# Patient Record
Sex: FEMALE | Race: WHITE | Employment: FULL TIME | URBAN - METROPOLITAN AREA
[De-identification: names, ages, dates, MRNs, and addresses within clinical notes are randomized per-mention and may not be internally consistent; named-entity substitution may affect disease eponyms.]

---

## 2017-03-10 ENCOUNTER — HOSPITAL ENCOUNTER (OUTPATIENT)
Dept: ULTRASOUND IMAGING | Facility: HOSPITAL | Age: 52
Discharge: HOME | End: 2017-03-10
Attending: PHYSICIAN ASSISTANT
Payer: COMMERCIAL

## 2017-03-10 DIAGNOSIS — E04.1 THYROID NODULE: ICD-10-CM

## 2017-03-10 PROCEDURE — 88173 CYTOPATH EVAL FNA REPORT: CPT | Performed by: PHYSICIAN ASSISTANT

## 2017-03-10 PROCEDURE — 76942 ECHO GUIDE FOR BIOPSY: CPT

## 2017-03-10 PROCEDURE — 10022 US FNA THYROID SH(CPT=10022/76942): CPT

## 2017-03-10 NOTE — PROCEDURES
[facility]  Procedure Note    [id number] [facility] Patient Status:  Outpatient    1965 MRN [id number]   Location [address] [facility] Attending Krysta Small PA-C   Hosp Day # 0 PCP No primary care provider on file.      Procedure: US guided FN

## 2017-03-17 NOTE — PROGRESS NOTES
Quick Note:    Pt informed of results and recommendations. Pt scheduled follow up appt.  With Dr. Aimee Xavier 4/11/17  ______

## 2017-03-17 NOTE — PROGRESS NOTES
Quick Note:    Please inform FNA to left thyroid nodule appears benign, right is nondiagnostic due to limited cells in sample, recommend she follow up with Dr Flynn Merlin to further discuss these results.   ______

## 2017-04-24 ENCOUNTER — TELEPHONE (OUTPATIENT)
Dept: OBGYN CLINIC | Facility: CLINIC | Age: 52
End: 2017-04-24

## 2017-04-24 DIAGNOSIS — Z12.31 VISIT FOR SCREENING MAMMOGRAM: Primary | ICD-10-CM

## 2017-04-24 NOTE — TELEPHONE ENCOUNTER
VENECIA IS REQUESTING AN ORDER FOR A MAMMOGRAM BE PUT INTO THE [email or web address] SYSTEM.  HER PHONE # [phone]

## 2017-04-24 NOTE — TELEPHONE ENCOUNTER
Message left physical is due. We will send order to [facility] for mammogram. She can call Tuesday to schedule.

## 2017-05-06 ENCOUNTER — HOSPITAL ENCOUNTER (OUTPATIENT)
Dept: MAMMOGRAPHY | Age: 52
Discharge: HOME | End: 2017-05-06
Attending: OBSTETRICS & GYNECOLOGY
Payer: COMMERCIAL

## 2017-05-06 DIAGNOSIS — Z12.31 VISIT FOR SCREENING MAMMOGRAM: ICD-10-CM

## 2017-05-06 PROCEDURE — 77067 SCR MAMMO BI INCL CAD: CPT | Performed by: OBSTETRICS & GYNECOLOGY

## 2017-05-12 ENCOUNTER — OFFICE VISIT (OUTPATIENT)
Dept: OBGYN CLINIC | Facility: CLINIC | Age: 52
End: 2017-05-12

## 2017-05-12 VITALS
TEMPERATURE: 98 F | DIASTOLIC BLOOD PRESSURE: 64 MMHG | RESPIRATION RATE: 16 BRPM | WEIGHT: 132 LBS | HEIGHT: 67 IN | HEART RATE: 60 BPM | BODY MASS INDEX: 20.72 KG/M2 | SYSTOLIC BLOOD PRESSURE: 108 MMHG

## 2017-05-12 DIAGNOSIS — Z11.51 SCREENING FOR HUMAN PAPILLOMAVIRUS: ICD-10-CM

## 2017-05-12 DIAGNOSIS — Z12.39 BREAST CANCER SCREENING: ICD-10-CM

## 2017-05-12 DIAGNOSIS — Z01.419 ENCOUNTER FOR ANNUAL ROUTINE GYNECOLOGICAL EXAMINATION: Primary | ICD-10-CM

## 2017-05-12 PROCEDURE — 88175 CYTOPATH C/V AUTO FLUID REDO: CPT | Performed by: OBSTETRICS & GYNECOLOGY

## 2017-05-12 PROCEDURE — 87624 HPV HI-RISK TYP POOLED RSLT: CPT | Performed by: OBSTETRICS & GYNECOLOGY

## 2017-05-12 PROCEDURE — 99396 PREV VISIT EST AGE 40-64: CPT | Performed by: OBSTETRICS & GYNECOLOGY

## 2017-05-12 RX ORDER — LEVOTHYROXINE SODIUM 0.03 MG/1
TABLET ORAL
Qty: 45 TABLET | Refills: 11 | Status: SHIPPED | OUTPATIENT
Start: 2017-05-12 | End: 2018-05-25

## 2017-05-12 NOTE — PROGRESS NOTES
HPI:   Gregg Sandra is a 45year old  who presents for an annual gynecological exam.   Menses: No LMP recorded (lmp unknown). Patient is not currently having periods (Reason: Menopause). Menopause: postmenopausal    No bleeding.  Seeing Dr Radha Beatty LMP  (LMP Unknown)  Breastfeeding?  No  GENERAL:  Well-appearing, no apparent distress, well nourished, well developed  SKIN: Normal, no rashes, no acne, no hirsutism, no ulcers  HEENT: Atraumatic, normocephalic, throat is clear  NECK: No masses, symmetric menopausal.  · I encouraged pt to have yearly annual examinations with her PCP for management of general medical problems. · - I encouraged smoking cessation, if indicated.     Pt voiced understanding of all instructions; all questions answered; no barrier

## 2017-05-19 ENCOUNTER — TELEPHONE (OUTPATIENT)
Dept: OBGYN CLINIC | Facility: CLINIC | Age: 52
End: 2017-05-19

## 2017-05-19 NOTE — TELEPHONE ENCOUNTER
Patient states this is an FYI for Dr Ayla Louie on Levothyroxine dose. She is on Levothyroxine 37.5mcg per day. The pill is 25 mcg. She takes 1 and 1/2 pills per day for a total of 45 pills per month.

## 2017-12-11 ENCOUNTER — TELEPHONE (OUTPATIENT)
Dept: OBGYN CLINIC | Facility: CLINIC | Age: 52
End: 2017-12-11

## 2017-12-11 NOTE — TELEPHONE ENCOUNTER
Message left per patient request. She needs to have a breast exam for area to be accessed. She has had a mammogram already this year.

## 2017-12-11 NOTE — TELEPHONE ENCOUNTER
Pt has been having some breast pain, no lumps, she would to have a mammogram scheduled.  She did mention she has dense breasts so always needs a more enhanced mammogram.  [id number] [address] to leave a message if she doesn't answer

## 2018-01-10 ENCOUNTER — OFFICE VISIT (OUTPATIENT)
Dept: OBGYN CLINIC | Facility: CLINIC | Age: 53
End: 2018-01-10

## 2018-01-10 VITALS
RESPIRATION RATE: 14 BRPM | DIASTOLIC BLOOD PRESSURE: 70 MMHG | HEIGHT: 65.75 IN | SYSTOLIC BLOOD PRESSURE: 98 MMHG | HEART RATE: 74 BPM | BODY MASS INDEX: 22.77 KG/M2 | TEMPERATURE: 97 F | WEIGHT: 140 LBS

## 2018-01-10 DIAGNOSIS — N64.4 BREAST PAIN: Primary | ICD-10-CM

## 2018-01-10 PROCEDURE — 99213 OFFICE O/P EST LOW 20 MIN: CPT | Performed by: OBSTETRICS & GYNECOLOGY

## 2018-01-10 NOTE — PROGRESS NOTES
CHIEF COMPLAINT:   Patient presents with  Breast pain   HPI:   Margo Urban is a 47year old  No LMP recorded. Patient is not currently having periods (Reason: Menopause). who presents with bilateral breast pain for past 2 mos.  Right more than lef

## 2018-01-23 ENCOUNTER — HOSPITAL ENCOUNTER (OUTPATIENT)
Dept: MAMMOGRAPHY | Facility: HOSPITAL | Age: 53
Discharge: HOME | End: 2018-01-23
Attending: OBSTETRICS & GYNECOLOGY
Payer: COMMERCIAL

## 2018-01-23 DIAGNOSIS — N64.4 BREAST PAIN: ICD-10-CM

## 2018-01-23 PROCEDURE — 76641 ULTRASOUND BREAST COMPLETE: CPT | Performed by: OBSTETRICS & GYNECOLOGY

## 2018-01-23 PROCEDURE — 77062 BREAST TOMOSYNTHESIS BI: CPT | Performed by: OBSTETRICS & GYNECOLOGY

## 2018-01-23 PROCEDURE — 77066 DX MAMMO INCL CAD BI: CPT | Performed by: OBSTETRICS & GYNECOLOGY

## 2018-01-24 DIAGNOSIS — R92.8 ABNORMAL FINDINGS ON DIAGNOSTIC IMAGING OF BREAST: Primary | ICD-10-CM

## 2018-04-30 ENCOUNTER — TELEPHONE (OUTPATIENT)
Dept: OBGYN CLINIC | Facility: CLINIC | Age: 53
End: 2018-04-30

## 2018-04-30 NOTE — TELEPHONE ENCOUNTER
PT Rx expires on 5.11.18 and she needs a refill  Her next appt is 5.16.18  Please call in to [address] ([address] [facility]), [place] [address] [phone], [phone]

## 2018-04-30 NOTE — TELEPHONE ENCOUNTER
PC with patient. She has enough medication to last until her appointment. She just picked up a refill. Told when she comes for appointment she will get an order to check TSH and a new prescription. Understands.

## 2018-05-25 ENCOUNTER — OFFICE VISIT (OUTPATIENT)
Dept: OBGYN CLINIC | Facility: CLINIC | Age: 53
End: 2018-05-25

## 2018-05-25 VITALS
HEART RATE: 60 BPM | RESPIRATION RATE: 12 BRPM | TEMPERATURE: 98 F | HEIGHT: 67 IN | SYSTOLIC BLOOD PRESSURE: 104 MMHG | BODY MASS INDEX: 23.07 KG/M2 | DIASTOLIC BLOOD PRESSURE: 68 MMHG | WEIGHT: 147 LBS

## 2018-05-25 DIAGNOSIS — E03.9 HYPOTHYROIDISM, UNSPECIFIED TYPE: ICD-10-CM

## 2018-05-25 DIAGNOSIS — Z11.51 SCREENING FOR HUMAN PAPILLOMAVIRUS: ICD-10-CM

## 2018-05-25 DIAGNOSIS — Z01.419 ENCOUNTER FOR ANNUAL ROUTINE GYNECOLOGICAL EXAMINATION: Primary | ICD-10-CM

## 2018-05-25 DIAGNOSIS — Z12.39 BREAST CANCER SCREENING: ICD-10-CM

## 2018-05-25 DIAGNOSIS — Z12.4 SCREENING FOR MALIGNANT NEOPLASM OF CERVIX: ICD-10-CM

## 2018-05-25 PROCEDURE — 87624 HPV HI-RISK TYP POOLED RSLT: CPT | Performed by: OBSTETRICS & GYNECOLOGY

## 2018-05-25 PROCEDURE — 88175 CYTOPATH C/V AUTO FLUID REDO: CPT | Performed by: OBSTETRICS & GYNECOLOGY

## 2018-05-25 PROCEDURE — 99396 PREV VISIT EST AGE 40-64: CPT | Performed by: OBSTETRICS & GYNECOLOGY

## 2018-05-25 RX ORDER — LEVOTHYROXINE SODIUM 0.03 MG/1
TABLET ORAL
Qty: 45 TABLET | Refills: 11 | Status: SHIPPED | OUTPATIENT
Start: 2018-05-25 | End: 2019-04-17

## 2018-05-25 NOTE — PROGRESS NOTES
HPI:   Adriana Lemos is a 47year old  who presents for an annual gynecological exam.   Menses: No LMP recorded. Patient is not currently having periods (Reason: Menopause).  Menopause: postmenopausal  Her last menstrual period was more than 2 years 104/68 (BP Location: Right arm, Patient Position: Sitting, Cuff Size: adult)   Pulse 60   Temp 97.6 °F (36.4 °C) (Oral)   Resp 12   Ht 67\"   Wt 147 lb   Breastfeeding?  No   BMI 23.02 kg/m²   GENERAL:  Well-appearing, no apparent distress, well nourished, mammograms to maintain breast health. · I encouraged pt being compliant with screening colonoscopy at the age of 47.   · I encouraged baseline Dexa if menopausal.  · I encouraged pt to have yearly annual examinations with her PCP for management of general

## 2018-07-26 ENCOUNTER — TELEPHONE (OUTPATIENT)
Dept: OBGYN CLINIC | Facility: CLINIC | Age: 53
End: 2018-07-26

## 2018-08-14 ENCOUNTER — HOSPITAL ENCOUNTER (OUTPATIENT)
Dept: MAMMOGRAPHY | Facility: HOSPITAL | Age: 53
Discharge: HOME | End: 2018-08-14
Attending: OBSTETRICS & GYNECOLOGY
Payer: COMMERCIAL

## 2018-08-14 DIAGNOSIS — Z12.39 BREAST CANCER SCREENING: ICD-10-CM

## 2018-08-14 PROCEDURE — 77067 SCR MAMMO BI INCL CAD: CPT | Performed by: OBSTETRICS & GYNECOLOGY

## 2018-08-14 PROCEDURE — 77063 BREAST TOMOSYNTHESIS BI: CPT | Performed by: OBSTETRICS & GYNECOLOGY

## 2019-04-17 RX ORDER — LEVOTHYROXINE SODIUM 0.03 MG/1
TABLET ORAL
Qty: 45 TABLET | Refills: 11 | Status: SHIPPED | OUTPATIENT
Start: 2019-04-17 | End: 2019-08-06

## 2019-05-29 ENCOUNTER — OFFICE VISIT (OUTPATIENT)
Dept: OBGYN CLINIC | Facility: CLINIC | Age: 54
End: 2019-05-29
Payer: COMMERCIAL

## 2019-05-29 ENCOUNTER — TELEPHONE (OUTPATIENT)
Dept: OBGYN CLINIC | Facility: CLINIC | Age: 54
End: 2019-05-29

## 2019-05-29 VITALS
TEMPERATURE: 99 F | WEIGHT: 140 LBS | HEIGHT: 67 IN | RESPIRATION RATE: 12 BRPM | HEART RATE: 72 BPM | DIASTOLIC BLOOD PRESSURE: 70 MMHG | SYSTOLIC BLOOD PRESSURE: 102 MMHG | BODY MASS INDEX: 21.97 KG/M2

## 2019-05-29 DIAGNOSIS — Z01.419 ENCOUNTER FOR ANNUAL ROUTINE GYNECOLOGICAL EXAMINATION: Primary | ICD-10-CM

## 2019-05-29 DIAGNOSIS — Z78.0 ASYMPTOMATIC POSTMENOPAUSAL STATUS (AGE-RELATED) (NATURAL): Primary | ICD-10-CM

## 2019-05-29 DIAGNOSIS — Z11.51 SCREENING FOR HUMAN PAPILLOMAVIRUS: ICD-10-CM

## 2019-05-29 DIAGNOSIS — Z12.4 SCREENING FOR MALIGNANT NEOPLASM OF THE CERVIX: ICD-10-CM

## 2019-05-29 DIAGNOSIS — Z12.39 BREAST CANCER SCREENING: ICD-10-CM

## 2019-05-29 PROCEDURE — 88175 CYTOPATH C/V AUTO FLUID REDO: CPT | Performed by: OBSTETRICS & GYNECOLOGY

## 2019-05-29 PROCEDURE — 87624 HPV HI-RISK TYP POOLED RSLT: CPT | Performed by: OBSTETRICS & GYNECOLOGY

## 2019-05-29 PROCEDURE — 99396 PREV VISIT EST AGE 40-64: CPT | Performed by: OBSTETRICS & GYNECOLOGY

## 2019-05-29 RX ORDER — LEVOTHYROXINE SODIUM 0.03 MG/1
25 TABLET ORAL
Qty: 90 TABLET | Refills: 0 | Status: SHIPPED | OUTPATIENT
Start: 2019-05-29 | End: 2019-08-06

## 2019-05-29 NOTE — TELEPHONE ENCOUNTER
Patient was seen today, was told there would be an order for a dexa scan. No order was placed. Wanted to double check if order is needed.

## 2019-05-29 NOTE — PROGRESS NOTES
HPI:   Ranger Niece is a 46year old  who presents for an annual gynecological exam.   Menses: No LMP recorded (lmp unknown). (Menstrual status: Menopause). Menopause: postmenopausal  Patient had a colonoscopy done 2 years ago.   Her paternal grand itching, periods regular   MUSCULOSKELETAL: denies back pain, muscle or joint aches  NEUROLOGIC: denies headaches  PSYCHIATRIC: denies depression or anxiety, mood is good  HEMATOLOGIC: denies history of anemia  ENDOCRINE: denies thyroid history, cold/heat both calcium and vitamin D.  ·  I encouraged monthly self breast exams; pt was told to perform these in the shower; she was instructed to perform these 7-10 days after her menses.   · I highly encouraged pt to be compliant with her yearly screening mammogra

## 2019-05-29 NOTE — PROGRESS NOTES
Pt here for pe/pap. LMP:over 2 years ago  Last pap: 5/25/18 negative  Last mammogram:8/14/18 negative  Birth control:n/a    Pt is hypoactive thyroid. She has supplied a list of bloodwork that she would like ordered.

## 2019-06-14 ENCOUNTER — HOSPITAL ENCOUNTER (OUTPATIENT)
Dept: BONE DENSITY | Age: 54
Discharge: HOME | End: 2019-06-14
Attending: OBSTETRICS & GYNECOLOGY
Payer: COMMERCIAL

## 2019-06-14 DIAGNOSIS — Z78.0 ASYMPTOMATIC POSTMENOPAUSAL STATUS (AGE-RELATED) (NATURAL): ICD-10-CM

## 2019-06-14 PROCEDURE — 77080 DXA BONE DENSITY AXIAL: CPT | Performed by: OBSTETRICS & GYNECOLOGY

## 2019-06-19 ENCOUNTER — OFFICE VISIT (OUTPATIENT)
Dept: OBGYN CLINIC | Facility: CLINIC | Age: 54
End: 2019-06-19
Payer: COMMERCIAL

## 2019-06-19 VITALS
RESPIRATION RATE: 12 BRPM | DIASTOLIC BLOOD PRESSURE: 80 MMHG | WEIGHT: 150 LBS | SYSTOLIC BLOOD PRESSURE: 114 MMHG | TEMPERATURE: 98 F | HEIGHT: 67 IN | HEART RATE: 64 BPM | BODY MASS INDEX: 23.54 KG/M2

## 2019-06-19 DIAGNOSIS — M81.0 AGE-RELATED OSTEOPOROSIS WITHOUT CURRENT PATHOLOGICAL FRACTURE: Primary | ICD-10-CM

## 2019-06-19 PROCEDURE — 99213 OFFICE O/P EST LOW 20 MIN: CPT | Performed by: OBSTETRICS & GYNECOLOGY

## 2019-06-19 RX ORDER — ALENDRONATE SODIUM 70 MG/1
70 TABLET ORAL WEEKLY
Qty: 12 TABLET | Refills: 1 | Status: SHIPPED | OUTPATIENT
Start: 2019-06-19 | End: 2019-08-06

## 2019-06-19 NOTE — PROGRESS NOTES
HPI:   Eve Daley is a 46year old female presents for consultation regarding a recent DEXA scan showing osteoporosis. Her lumbar spine showed a T score of -2.9.   Hip analysis with T score -1.4 and femoral neck with T score of -2.0 she is on thyroid before breakfast. Disp: 90 tablet Rfl: 0   Levothyroxine Sodium 25 MCG Oral Tab Take 1.5 tablets daily Disp: 45 tablet Rfl: 11         Allergies:  No Known Allergies    Social History:    Denies tobacco or alcohol    Family History:  Family History   Probl

## 2019-06-19 NOTE — PROGRESS NOTES
Pt here for follow up on dexa scan . She states she will need refill on levothyroxine.   She sees endocrinologist in August.

## 2019-07-15 ENCOUNTER — TELEPHONE (OUTPATIENT)
Dept: OBGYN CLINIC | Facility: CLINIC | Age: 54
End: 2019-07-15

## 2019-07-15 NOTE — TELEPHONE ENCOUNTER
Patient was given a prescription for calcium supplement, but want to know if there is something over the counter she can take instead

## 2019-07-17 NOTE — TELEPHONE ENCOUNTER
PC with patient. She states she prefers not to take the Fosamax. She does not like to take medications. Asking if there is an otc option? She is taking Calcium 756 mg- BID and Calcium 1000 mg daily. She has also increased her dairy.

## 2019-07-18 NOTE — TELEPHONE ENCOUNTER
PC with patient. Per Dr Mily Jones. She should not take more than 1200 mg of calcium per day and to get weight bearing exercises in regularly. Understands.

## 2019-07-25 RX ORDER — LEVOTHYROXINE SODIUM 0.03 MG/1
TABLET ORAL
Qty: 45 TABLET | Refills: 0 | Status: SHIPPED | OUTPATIENT
Start: 2019-07-25 | End: 2019-08-15

## 2019-07-25 NOTE — TELEPHONE ENCOUNTER
PC with patient. Received refill request for Levothyroxine. She states she has an appointment with a doctor in August but needs 1 month refill. She states she had discussed it with the doctor at her last appointment.

## 2019-08-15 ENCOUNTER — HOSPITAL ENCOUNTER (OUTPATIENT)
Dept: MAMMOGRAPHY | Age: 54
Discharge: HOME | End: 2019-08-15
Attending: OBSTETRICS & GYNECOLOGY
Payer: COMMERCIAL

## 2019-08-15 DIAGNOSIS — Z12.39 BREAST CANCER SCREENING: ICD-10-CM

## 2019-08-15 PROCEDURE — 77067 SCR MAMMO BI INCL CAD: CPT | Performed by: OBSTETRICS & GYNECOLOGY

## 2019-08-15 PROCEDURE — 77063 BREAST TOMOSYNTHESIS BI: CPT | Performed by: OBSTETRICS & GYNECOLOGY

## 2019-08-29 PROCEDURE — 84482 T3 REVERSE: CPT | Performed by: INTERNAL MEDICINE

## 2019-08-29 PROCEDURE — 86800 THYROGLOBULIN ANTIBODY: CPT | Performed by: INTERNAL MEDICINE

## 2019-08-29 PROCEDURE — 84630 ASSAY OF ZINC: CPT | Performed by: INTERNAL MEDICINE

## 2019-08-29 PROCEDURE — 86376 MICROSOMAL ANTIBODY EACH: CPT | Performed by: INTERNAL MEDICINE

## 2019-08-29 PROCEDURE — 84590 ASSAY OF VITAMIN A: CPT | Performed by: INTERNAL MEDICINE

## 2019-08-29 PROCEDURE — 83520 IMMUNOASSAY QUANT NOS NONAB: CPT | Performed by: INTERNAL MEDICINE

## 2019-08-29 PROCEDURE — 84445 ASSAY OF TSI GLOBULIN: CPT | Performed by: INTERNAL MEDICINE

## 2019-08-29 PROCEDURE — 84255 ASSAY OF SELENIUM: CPT | Performed by: INTERNAL MEDICINE

## 2020-07-29 ENCOUNTER — OFFICE VISIT (OUTPATIENT)
Dept: OBGYN CLINIC | Facility: CLINIC | Age: 55
End: 2020-07-29
Payer: COMMERCIAL

## 2020-07-29 VITALS
TEMPERATURE: 98 F | HEIGHT: 67 IN | WEIGHT: 128 LBS | BODY MASS INDEX: 20.09 KG/M2 | DIASTOLIC BLOOD PRESSURE: 76 MMHG | RESPIRATION RATE: 12 BRPM | SYSTOLIC BLOOD PRESSURE: 122 MMHG | HEART RATE: 70 BPM

## 2020-07-29 DIAGNOSIS — Z11.51 SCREENING FOR HUMAN PAPILLOMAVIRUS: ICD-10-CM

## 2020-07-29 DIAGNOSIS — Z01.419 ENCOUNTER FOR ANNUAL ROUTINE GYNECOLOGICAL EXAMINATION: Primary | ICD-10-CM

## 2020-07-29 DIAGNOSIS — Z12.4 SCREENING FOR MALIGNANT NEOPLASM OF THE CERVIX: ICD-10-CM

## 2020-07-29 DIAGNOSIS — Z12.31 BREAST CANCER SCREENING BY MAMMOGRAM: ICD-10-CM

## 2020-07-29 PROCEDURE — 88175 CYTOPATH C/V AUTO FLUID REDO: CPT | Performed by: OBSTETRICS & GYNECOLOGY

## 2020-07-29 PROCEDURE — 3074F SYST BP LT 130 MM HG: CPT | Performed by: OBSTETRICS & GYNECOLOGY

## 2020-07-29 PROCEDURE — 99396 PREV VISIT EST AGE 40-64: CPT | Performed by: OBSTETRICS & GYNECOLOGY

## 2020-07-29 PROCEDURE — 3078F DIAST BP <80 MM HG: CPT | Performed by: OBSTETRICS & GYNECOLOGY

## 2020-07-29 PROCEDURE — 3008F BODY MASS INDEX DOCD: CPT | Performed by: OBSTETRICS & GYNECOLOGY

## 2020-07-29 PROCEDURE — 87624 HPV HI-RISK TYP POOLED RSLT: CPT | Performed by: OBSTETRICS & GYNECOLOGY

## 2020-07-29 NOTE — PROGRESS NOTES
Pt here for pe/pap.   LMP:Menopause  Last pap:5/29/19 negative   Last mammogram;5/29/19 negative   Birth control:Menopause

## 2020-07-29 NOTE — PROGRESS NOTES
HPI:   Alfredo Camacho is a 53year old  who presents for an annual gynecological exam.   Menses: No LMP recorded. (Menstrual status: Menopause).  Menopause: postmenopausal    Decided not to take fosomax for osteoporosis  Current Outpatient Medications intolerance  ALLERGIC: denies allergy symptoms    EXAM:     VITALS: /76 (BP Location: Right arm, Patient Position: Sitting, Cuff Size: adult)   Pulse 70   Temp 98.2 °F (36.8 °C) (Oral)   Resp 12   Ht 67\"   Wt 128 lb (58.1 kg)   Breastfeeding No   BM menses. · I highly encouraged pt to be compliant with her yearly screening mammograms to maintain breast health. · I encouraged pt being compliant with screening colonoscopy at the age of 47.   · I encouraged baseline Dexa if menopausal.  · I encouraged p

## 2020-07-31 LAB — HPV I/H RISK 1 DNA SPEC QL NAA+PROBE: NEGATIVE

## 2020-08-18 ENCOUNTER — TELEPHONE (OUTPATIENT)
Dept: OBGYN CLINIC | Facility: CLINIC | Age: 55
End: 2020-08-18

## 2020-08-18 NOTE — TELEPHONE ENCOUNTER
Reviewed pap and HPV result with patient. Explained ASC-US to patient. Patient recenly statred taking progesterone 25mg daily and estrodial vaginal cream daily just before the pap was preformed.  She is wondering if this could have anything to do with the A

## 2021-04-20 ENCOUNTER — IMMUNIZATION (OUTPATIENT)
Dept: LAB | Age: 56
End: 2021-04-20
Attending: HOSPITALIST
Payer: COMMERCIAL

## 2021-04-20 DIAGNOSIS — Z23 NEED FOR VACCINATION: Primary | ICD-10-CM

## 2021-04-20 PROCEDURE — 0001A SARSCOV2 VAC 30MCG/0.3ML IM: CPT

## 2021-05-11 ENCOUNTER — IMMUNIZATION (OUTPATIENT)
Dept: LAB | Age: 56
End: 2021-05-11
Attending: HOSPITALIST
Payer: COMMERCIAL

## 2021-05-11 DIAGNOSIS — Z23 NEED FOR VACCINATION: Primary | ICD-10-CM

## 2021-05-11 PROCEDURE — 0002A SARSCOV2 VAC 30MCG/0.3ML IM: CPT

## 2021-08-09 ENCOUNTER — OFFICE VISIT (OUTPATIENT)
Dept: OBGYN CLINIC | Facility: CLINIC | Age: 56
End: 2021-08-09
Payer: COMMERCIAL

## 2021-08-09 VITALS
HEIGHT: 67 IN | TEMPERATURE: 99 F | BODY MASS INDEX: 21.66 KG/M2 | DIASTOLIC BLOOD PRESSURE: 72 MMHG | SYSTOLIC BLOOD PRESSURE: 110 MMHG | RESPIRATION RATE: 14 BRPM | HEART RATE: 68 BPM | WEIGHT: 138 LBS

## 2021-08-09 DIAGNOSIS — Z12.31 BREAST CANCER SCREENING BY MAMMOGRAM: ICD-10-CM

## 2021-08-09 DIAGNOSIS — Z01.419 ENCOUNTER FOR ANNUAL ROUTINE GYNECOLOGICAL EXAMINATION: Primary | ICD-10-CM

## 2021-08-09 DIAGNOSIS — Z12.4 SCREENING FOR MALIGNANT NEOPLASM OF CERVIX: ICD-10-CM

## 2021-08-09 DIAGNOSIS — Z11.51 SCREENING FOR HUMAN PAPILLOMAVIRUS: ICD-10-CM

## 2021-08-09 PROCEDURE — 88175 CYTOPATH C/V AUTO FLUID REDO: CPT | Performed by: OBSTETRICS & GYNECOLOGY

## 2021-08-09 PROCEDURE — 99396 PREV VISIT EST AGE 40-64: CPT | Performed by: OBSTETRICS & GYNECOLOGY

## 2021-08-09 PROCEDURE — 3008F BODY MASS INDEX DOCD: CPT | Performed by: OBSTETRICS & GYNECOLOGY

## 2021-08-09 PROCEDURE — 3078F DIAST BP <80 MM HG: CPT | Performed by: OBSTETRICS & GYNECOLOGY

## 2021-08-09 PROCEDURE — 3074F SYST BP LT 130 MM HG: CPT | Performed by: OBSTETRICS & GYNECOLOGY

## 2021-08-09 PROCEDURE — 87624 HPV HI-RISK TYP POOLED RSLT: CPT | Performed by: OBSTETRICS & GYNECOLOGY

## 2021-08-09 NOTE — PROGRESS NOTES
HPI:   Ramakrishna Herr is a 61year old  who presents for an annual gynecological exam.   Menses: No LMP recorded (lmp unknown). Patient is postmenopausal. Menopause: postmenopausal     No current outpatient medications on file.       Past Medical Histo 67\"   Wt 138 lb (62.6 kg)   LMP  (LMP Unknown)   Breastfeeding No   BMI 21.61 kg/m²   GENERAL:  Well-appearing, no apparent distress, well nourished, well developed  SKIN: Normal, no rashes, no acne, no hirsutism, no ulcers  HEENT: Atraumatic, normocephal 50.  · I encouraged baseline Dexa if menopausal.  · I encouraged pt to have yearly annual examinations with her PCP for management of general medical problems. · - I encouraged smoking cessation, if indicated.     Pt voiced understanding of all instruction

## 2021-08-09 NOTE — PROGRESS NOTES
Pt here for pe/pap. LMP:Post menopausal   Last pap:7/29/20 negative   Last mammogram;8/19/20 negative ordered by PCP.   Birth control:N/A

## 2021-08-10 LAB — HPV I/H RISK 1 DNA SPEC QL NAA+PROBE: NEGATIVE

## 2021-08-25 ENCOUNTER — HOSPITAL ENCOUNTER (OUTPATIENT)
Dept: MAMMOGRAPHY | Age: 56
Discharge: HOME | End: 2021-08-25
Attending: OBSTETRICS & GYNECOLOGY
Payer: COMMERCIAL

## 2021-08-25 DIAGNOSIS — Z12.31 BREAST CANCER SCREENING BY MAMMOGRAM: ICD-10-CM

## 2021-08-25 PROCEDURE — 77067 SCR MAMMO BI INCL CAD: CPT | Performed by: OBSTETRICS & GYNECOLOGY

## 2021-08-25 PROCEDURE — 77063 BREAST TOMOSYNTHESIS BI: CPT | Performed by: OBSTETRICS & GYNECOLOGY

## 2021-08-31 ENCOUNTER — HOSPITAL ENCOUNTER (OUTPATIENT)
Dept: MAMMOGRAPHY | Facility: HOSPITAL | Age: 56
Discharge: HOME | End: 2021-08-31
Attending: OBSTETRICS & GYNECOLOGY
Payer: COMMERCIAL

## 2021-08-31 DIAGNOSIS — R92.2 INCONCLUSIVE MAMMOGRAM: ICD-10-CM

## 2021-08-31 PROCEDURE — 76642 ULTRASOUND BREAST LIMITED: CPT | Performed by: OBSTETRICS & GYNECOLOGY

## 2021-08-31 PROCEDURE — 77065 DX MAMMO INCL CAD UNI: CPT | Performed by: OBSTETRICS & GYNECOLOGY

## 2021-08-31 PROCEDURE — 77061 BREAST TOMOSYNTHESIS UNI: CPT | Performed by: OBSTETRICS & GYNECOLOGY

## 2022-05-25 ENCOUNTER — TELEPHONE (OUTPATIENT)
Dept: OBGYN CLINIC | Facility: CLINIC | Age: 57
End: 2022-05-25

## 2022-08-24 ENCOUNTER — OFFICE VISIT (OUTPATIENT)
Dept: OBGYN CLINIC | Facility: CLINIC | Age: 57
End: 2022-08-24
Payer: COMMERCIAL

## 2022-08-24 VITALS
DIASTOLIC BLOOD PRESSURE: 84 MMHG | SYSTOLIC BLOOD PRESSURE: 134 MMHG | HEART RATE: 67 BPM | HEIGHT: 67 IN | BODY MASS INDEX: 21.66 KG/M2 | WEIGHT: 138 LBS

## 2022-08-24 DIAGNOSIS — Z12.31 ENCOUNTER FOR SCREENING MAMMOGRAM FOR MALIGNANT NEOPLASM OF BREAST: ICD-10-CM

## 2022-08-24 DIAGNOSIS — Z01.419 ENCOUNTER FOR ANNUAL ROUTINE GYNECOLOGICAL EXAMINATION: Primary | ICD-10-CM

## 2022-08-30 ENCOUNTER — HOSPITAL ENCOUNTER (OUTPATIENT)
Dept: MAMMOGRAPHY | Age: 57
Discharge: HOME | End: 2022-08-30
Attending: OBSTETRICS & GYNECOLOGY
Payer: COMMERCIAL

## 2022-08-30 DIAGNOSIS — Z12.31 ENCOUNTER FOR SCREENING MAMMOGRAM FOR MALIGNANT NEOPLASM OF BREAST: ICD-10-CM

## 2022-08-30 PROCEDURE — 77063 BREAST TOMOSYNTHESIS BI: CPT | Performed by: OBSTETRICS & GYNECOLOGY

## 2022-08-30 PROCEDURE — 77067 SCR MAMMO BI INCL CAD: CPT | Performed by: OBSTETRICS & GYNECOLOGY

## 2023-07-31 ENCOUNTER — TELEPHONE (OUTPATIENT)
Facility: CLINIC | Age: 58
End: 2023-07-31

## 2023-07-31 DIAGNOSIS — Z12.31 ENCOUNTER FOR SCREENING MAMMOGRAM FOR MALIGNANT NEOPLASM OF BREAST: Primary | ICD-10-CM

## 2023-07-31 NOTE — TELEPHONE ENCOUNTER
Pt called requesting a mammogram order.  Pt is previous [facility] pt, scheduled [facility] In October with AM. Please advise and call when able

## 2023-07-31 NOTE — TELEPHONE ENCOUNTER
Pt was last seen 8/24/22 for her annual exam. She has an upcoming appointment scheduled 10/5/23. She is requesting a screening mammogram order. Order pended. Will call pt once order has been placed.

## 2023-07-31 NOTE — TELEPHONE ENCOUNTER
Pt aware mammogram order has been placed. Central scheduling number given. Verbalized understanding.

## 2023-09-01 ENCOUNTER — HOSPITAL ENCOUNTER (OUTPATIENT)
Dept: MAMMOGRAPHY | Age: 58
Discharge: HOME | End: 2023-09-01
Attending: STUDENT IN AN ORGANIZED HEALTH CARE EDUCATION/TRAINING PROGRAM
Payer: COMMERCIAL

## 2023-09-01 DIAGNOSIS — Z12.31 ENCOUNTER FOR SCREENING MAMMOGRAM FOR MALIGNANT NEOPLASM OF BREAST: ICD-10-CM

## 2023-09-01 PROCEDURE — 77067 SCR MAMMO BI INCL CAD: CPT | Performed by: STUDENT IN AN ORGANIZED HEALTH CARE EDUCATION/TRAINING PROGRAM

## 2023-09-01 PROCEDURE — 77063 BREAST TOMOSYNTHESIS BI: CPT | Performed by: STUDENT IN AN ORGANIZED HEALTH CARE EDUCATION/TRAINING PROGRAM

## 2023-10-06 ENCOUNTER — OFFICE VISIT (OUTPATIENT)
Facility: CLINIC | Age: 58
End: 2023-10-06
Payer: COMMERCIAL

## 2023-10-06 VITALS
WEIGHT: 139 LBS | HEIGHT: 67 IN | RESPIRATION RATE: 18 BRPM | DIASTOLIC BLOOD PRESSURE: 60 MMHG | SYSTOLIC BLOOD PRESSURE: 116 MMHG | BODY MASS INDEX: 21.82 KG/M2 | HEART RATE: 77 BPM

## 2023-10-06 DIAGNOSIS — Z01.419 ENCOUNTER FOR ANNUAL ROUTINE GYNECOLOGICAL EXAMINATION: Primary | ICD-10-CM

## 2023-10-06 PROCEDURE — 99396 PREV VISIT EST AGE 40-64: CPT | Performed by: STUDENT IN AN ORGANIZED HEALTH CARE EDUCATION/TRAINING PROGRAM

## 2023-10-06 PROCEDURE — 3074F SYST BP LT 130 MM HG: CPT | Performed by: STUDENT IN AN ORGANIZED HEALTH CARE EDUCATION/TRAINING PROGRAM

## 2023-10-06 PROCEDURE — 3008F BODY MASS INDEX DOCD: CPT | Performed by: STUDENT IN AN ORGANIZED HEALTH CARE EDUCATION/TRAINING PROGRAM

## 2023-10-06 PROCEDURE — 3078F DIAST BP <80 MM HG: CPT | Performed by: STUDENT IN AN ORGANIZED HEALTH CARE EDUCATION/TRAINING PROGRAM

## 2024-09-03 ENCOUNTER — HOSPITAL ENCOUNTER (OUTPATIENT)
Dept: MAMMOGRAPHY | Age: 59
Discharge: HOME | End: 2024-09-03
Attending: STUDENT IN AN ORGANIZED HEALTH CARE EDUCATION/TRAINING PROGRAM
Payer: COMMERCIAL

## 2024-09-03 DIAGNOSIS — Z12.31 ENCOUNTER FOR SCREENING MAMMOGRAM FOR MALIGNANT NEOPLASM OF BREAST: ICD-10-CM

## 2024-09-03 PROCEDURE — 77067 SCR MAMMO BI INCL CAD: CPT | Performed by: STUDENT IN AN ORGANIZED HEALTH CARE EDUCATION/TRAINING PROGRAM

## 2024-09-03 PROCEDURE — 77063 BREAST TOMOSYNTHESIS BI: CPT | Performed by: STUDENT IN AN ORGANIZED HEALTH CARE EDUCATION/TRAINING PROGRAM

## 2024-10-16 ENCOUNTER — PATIENT MESSAGE (OUTPATIENT)
Dept: OBGYN CLINIC | Facility: CLINIC | Age: 59
End: 2024-10-16

## 2024-10-16 ENCOUNTER — OFFICE VISIT (OUTPATIENT)
Dept: OBGYN CLINIC | Facility: CLINIC | Age: 59
End: 2024-10-16
Payer: COMMERCIAL

## 2024-10-16 VITALS
BODY MASS INDEX: 21.66 KG/M2 | HEART RATE: 55 BPM | DIASTOLIC BLOOD PRESSURE: 76 MMHG | WEIGHT: 138 LBS | SYSTOLIC BLOOD PRESSURE: 124 MMHG | HEIGHT: 67 IN

## 2024-10-16 DIAGNOSIS — Z01.419 ENCOUNTER FOR ANNUAL ROUTINE GYNECOLOGICAL EXAMINATION: Primary | ICD-10-CM

## 2024-10-16 DIAGNOSIS — Z12.4 ENCOUNTER FOR PAPANICOLAOU SMEAR FOR CERVICAL CANCER SCREENING: ICD-10-CM

## 2024-10-16 DIAGNOSIS — M80.00XA OSTEOPOROSIS WITH CURRENT PATHOLOGICAL FRACTURE, UNSPECIFIED OSTEOPOROSIS TYPE, INITIAL ENCOUNTER: Primary | ICD-10-CM

## 2024-10-16 DIAGNOSIS — Z91.89 AT RISK FOR OSTEOPOROSIS: ICD-10-CM

## 2024-10-16 PROCEDURE — 99396 PREV VISIT EST AGE 40-64: CPT | Performed by: STUDENT IN AN ORGANIZED HEALTH CARE EDUCATION/TRAINING PROGRAM

## 2024-10-16 PROCEDURE — 99459 PELVIC EXAMINATION: CPT | Performed by: STUDENT IN AN ORGANIZED HEALTH CARE EDUCATION/TRAINING PROGRAM

## 2024-10-16 PROCEDURE — 3008F BODY MASS INDEX DOCD: CPT | Performed by: STUDENT IN AN ORGANIZED HEALTH CARE EDUCATION/TRAINING PROGRAM

## 2024-10-16 PROCEDURE — 87624 HPV HI-RISK TYP POOLED RSLT: CPT | Performed by: STUDENT IN AN ORGANIZED HEALTH CARE EDUCATION/TRAINING PROGRAM

## 2024-10-16 PROCEDURE — 3074F SYST BP LT 130 MM HG: CPT | Performed by: STUDENT IN AN ORGANIZED HEALTH CARE EDUCATION/TRAINING PROGRAM

## 2024-10-16 PROCEDURE — 3078F DIAST BP <80 MM HG: CPT | Performed by: STUDENT IN AN ORGANIZED HEALTH CARE EDUCATION/TRAINING PROGRAM

## 2024-10-16 NOTE — PROGRESS NOTES
[facility]  Obstetrics and Gynecology   History & Physical    Chief complaint:   Chief Complaint   Patient presents with    Wellness Visit     Annual Exam         HPI: Jenny Nelson is a 59 year old  with No LMP recorded (lmp unknown). Patient is postmenopausal.      Here for annual GYN exam, no specific concerns  No PMB. No hot flashes. +vaginal dryness but is not bothersome to her  She had a full body MRI done at a private company and was told she had a \"small fracture in her hip\", pt had been essentially asymptomatic, had some pain she thought was musculoskeletal but then it resolved, did not have any specific treatment for this, did not see ortho      Pap Date: 21  Pap Result Notes: Negative    PMHx/Surghx reviewed, below. Of note: uncomplicated, pt reports no medical issues, not on any medications  Never had abnormal pap  Famhx: grandmother had breast cancer, grandfather colon cancer. No ovarian or uteirne cncers    PCP: none     Review of Systems:  Constitutional:  Denies fatigue, night sweats, hot flashes  Eyes:  denies blurred or double vision  Cardiovascular:  denies chest pain or palpitations  Respiratory:  denies shortness of breath  Gastrointestinal:  denies heartburn, abdominal pain, diarrhea or constipation  Genitourinary:  denies dysuria, incontinence, abnormal vaginal discharge, vaginal itching  Musculoskeletal:  denies back pain.  Skin/Breast:  Denies any breast pain, lumps, or discharge.   Neurological:  denies headaches, extremity weakness or numbness.  Psychiatric: denies depression or anxiety.  Endocrine:   denies excessive thirst or urination.  Heme/Lymph:  denies history of anemia, easy bruising or bleeding.    OB History:  OB History    Para Term  AB Living   0 0 0 0 0 0   SAB IAB Ectopic Multiple Live Births   0 0 0 0         Meds:  No current outpatient medications on file prior to visit.     No current facility-administered medications on file prior to  visit.       All:  No Known Allergies    PMH:  Past Medical History:    Amenorrhea    menopause    History of mammogram    Benign    Pap smear for cervical cancer screening    Negative HPV Negative    Thyroid disease       PSH:  History reviewed. No pertinent surgical history.    Social History:  Social History     Socioeconomic History    Marital status: Single     Spouse name: Not on file    Number of children: Not on file    Years of education: Not on file    Highest education level: Not on file   Occupational History    Not on file   Tobacco Use    Smoking status: Never    Smokeless tobacco: Never   Vaping Use    Vaping status: Never Used   Substance and Sexual Activity    Alcohol use: No    Drug use: No    Sexual activity: Not Currently   Other Topics Concern    Caffeine Concern No    Exercise Yes    Seat Belt Yes    Special Diet Yes     Comment: vegan    Stress Concern No    Weight Concern No     Service Not Asked    Blood Transfusions Not Asked    Occupational Exposure Not Asked    Hobby Hazards Not Asked    Sleep Concern Not Asked    Back Care Not Asked    Bike Helmet Not Asked    Self-Exams Not Asked   Social History Narrative    Not on file     Social Drivers of Health     Financial Resource Strain: Not on file   Food Insecurity: Not on file   Transportation Needs: Not on file   Physical Activity: Not on file   Stress: Not on file   Social Connections: Not on file   Housing Stability: Not on file        Family History:  Family History   Problem Relation Age of Onset    Heart Disorder Mother         valve replacement    Cancer Father     Other (Other) Father         neuromuscular disease    Breast Cancer Paternal Grandmother 55        55    Cancer Paternal Grandmother     Cancer Maternal Grandfather     Heart Disorder Maternal Grandfather         quadruple bypass    Ear Problems Neg     Allergies Neg     Bleeding Disorders Neg     Clotting Disorder Neg     Thyroid disease Neg     Hypertension Neg      Asthma Neg     Arthritis Neg     Diabetes Neg     Anesthesia Problems  Neg        PHYSICAL EXAM:     Vitals:    10/16/24 0937   BP: 124/76   Pulse: 55   Weight: 138 lb (62.6 kg)   Height: 67\"       Body mass index is 21.61 kg/m².      Constitutional: well developed, well nourished  Head/Face: normocephalic  Neck/Thyroid: thyroid symmetric, no thyromegaly, no nodules, no adenopathy  Lymphatic: no abnormal supraclavicular or axillary adenopathy is noted  Breast: normal without palpable masses, tenderness, asymmetry, nipple discharge, nipple retraction or skin changes  Abdomen:  soft, nontender, nondistended, no masses  Skin/Hair: no unusual rashes or bruises  Extremities: no edema, no cyanosis  Psychiatric:  Oriented to time, place, person and situation. Appropriate mood and affect    Pelvic Exam:  External Genitalia: normal appearance, hair distribution, and no lesions  Urethral Meatus:  normal in size, location, without lesions and prolapse  Bladder:  No fullness, masses or tenderness  Vagina:  Normal appearance without lesions, no abnormal discharge. + atrophy  Cervix:  Normal without tenderness on motion  Uterus: normal in size, contour, position, mobility, without tenderness  Adnexa: normal without masses or tenderness  Perineum: normal  Anus: no hemorrhoids     Assessment & Plan:     Jenny Nelson is a 59 year old      Diagnoses and all orders for this visit:    Encounter for annual routine gynecological examination  -     ThinPrep PAP Smear; Future  -     Hpv Dna  High Risk , Thin Prep Collect; Future  -     CBC With Differential With Platelet; Future  -     Comp Metabolic Panel (14); Future  -     Lipid Panel; Future  -     Assay, Thyroid Stim Hormone; Future  -     Hemoglobin A1C; Future  -     Vitamin D, 25-Hydroxy; Future    Encounter for Papanicolaou smear for cervical cancer screening  -     ThinPrep PAP Smear; Future  -     Hpv Dna  High Risk , Thin Prep Collect; Future    At risk for  osteoporosis  -     XR DEXA BONE DENSITOMETRY (CPT=77080); Future         Normal breast and pelvic exam  Routine labs ordered as pt does not have current PCP    Healthcare maintenance:  Pap: done  Mammogram 9/2024, negative  Colonoscopy done age 53-54, per pt normal, was told to repeat 10y  DEXA - ordered. D/w pt that if shows osteoporosis would recommend treatment regardless, but that if findings in normal-osteopenic range may recommend seeing a PCP or endocrinologist about this \"small hip fracture\" she had noted on the private full body MRI, and whether that alone would indicate medical management of osteoporosis          Yvette Guan MD  [facility] - OBGYN

## 2024-10-17 ENCOUNTER — HOSPITAL ENCOUNTER (OUTPATIENT)
Dept: BONE DENSITY | Age: 59
Discharge: HOME | End: 2024-10-17
Attending: STUDENT IN AN ORGANIZED HEALTH CARE EDUCATION/TRAINING PROGRAM
Payer: COMMERCIAL

## 2024-10-17 ENCOUNTER — LAB ENCOUNTER (OUTPATIENT)
Dept: LAB | Age: 59
End: 2024-10-17
Attending: STUDENT IN AN ORGANIZED HEALTH CARE EDUCATION/TRAINING PROGRAM
Payer: COMMERCIAL

## 2024-10-17 DIAGNOSIS — R79.89 ELEVATED TSH: ICD-10-CM

## 2024-10-17 DIAGNOSIS — Z91.89 AT RISK FOR OSTEOPOROSIS: ICD-10-CM

## 2024-10-17 DIAGNOSIS — Z01.419 ENCOUNTER FOR ANNUAL ROUTINE GYNECOLOGICAL EXAMINATION: ICD-10-CM

## 2024-10-17 LAB
ALBUMIN SERPL-MCNC: 4.6 G/DL (ref 3.2–4.8)
ALBUMIN/GLOB SERPL: 1.6 {RATIO} (ref 1–2)
ALP LIVER SERPL-CCNC: 85 U/L
ALT SERPL-CCNC: 21 U/L
ANION GAP SERPL CALC-SCNC: 7 MMOL/L (ref 0–18)
AST SERPL-CCNC: 31 U/L (ref ?–34)
BASOPHILS # BLD AUTO: 0.06 X10(3) UL (ref 0–0.2)
BASOPHILS NFR BLD AUTO: 0.9 %
BILIRUB SERPL-MCNC: 0.7 MG/DL (ref 0.2–1.1)
BUN BLD-MCNC: 9 MG/DL (ref 9–23)
CALCIUM BLD-MCNC: 9.8 MG/DL (ref 8.7–10.4)
CHLORIDE SERPL-SCNC: 104 MMOL/L (ref 98–112)
CHOLEST SERPL-MCNC: 217 MG/DL (ref ?–200)
CO2 SERPL-SCNC: 28 MMOL/L (ref 21–32)
CREAT BLD-MCNC: 0.79 MG/DL
EGFRCR SERPLBLD CKD-EPI 2021: 86 ML/MIN/1.73M2 (ref 60–?)
EOSINOPHIL # BLD AUTO: 0.06 X10(3) UL (ref 0–0.7)
EOSINOPHIL NFR BLD AUTO: 0.9 %
ERYTHROCYTE [DISTWIDTH] IN BLOOD BY AUTOMATED COUNT: 12.9 %
EST. AVERAGE GLUCOSE BLD GHB EST-MCNC: 120 MG/DL (ref 68–126)
FASTING PATIENT LIPID ANSWER: YES
FASTING STATUS PATIENT QL REPORTED: YES
GLOBULIN PLAS-MCNC: 2.9 G/DL (ref 2–3.5)
GLUCOSE BLD-MCNC: 92 MG/DL (ref 70–99)
HBA1C MFR BLD: 5.8 % (ref ?–5.7)
HCT VFR BLD AUTO: 41.5 %
HDLC SERPL-MCNC: 64 MG/DL (ref 40–59)
HGB BLD-MCNC: 13.7 G/DL
HPV E6+E7 MRNA CVX QL NAA+PROBE: NEGATIVE
IMM GRANULOCYTES # BLD AUTO: 0.06 X10(3) UL (ref 0–1)
IMM GRANULOCYTES NFR BLD: 0.9 %
LDLC SERPL CALC-MCNC: 135 MG/DL (ref ?–100)
LYMPHOCYTES # BLD AUTO: 1.95 X10(3) UL (ref 1–4)
LYMPHOCYTES NFR BLD AUTO: 29.7 %
MCH RBC QN AUTO: 30.4 PG (ref 26–34)
MCHC RBC AUTO-ENTMCNC: 33 G/DL (ref 31–37)
MCV RBC AUTO: 92.2 FL
MONOCYTES # BLD AUTO: 0.75 X10(3) UL (ref 0.1–1)
MONOCYTES NFR BLD AUTO: 11.4 %
NEUTROPHILS # BLD AUTO: 3.69 X10 (3) UL (ref 1.5–7.7)
NEUTROPHILS # BLD AUTO: 3.69 X10(3) UL (ref 1.5–7.7)
NEUTROPHILS NFR BLD AUTO: 56.2 %
NONHDLC SERPL-MCNC: 153 MG/DL (ref ?–130)
OSMOLALITY SERPL CALC.SUM OF ELEC: 286 MOSM/KG (ref 275–295)
PLATELET # BLD AUTO: 258 10(3)UL (ref 150–450)
POTASSIUM SERPL-SCNC: 4.5 MMOL/L (ref 3.5–5.1)
PROT SERPL-MCNC: 7.5 G/DL (ref 5.7–8.2)
RBC # BLD AUTO: 4.5 X10(6)UL
SODIUM SERPL-SCNC: 139 MMOL/L (ref 136–145)
T4 FREE SERPL-MCNC: 1 NG/DL (ref 0.8–1.7)
TRIGL SERPL-MCNC: 102 MG/DL (ref 30–149)
TSI SER-ACNC: 10.45 MIU/ML (ref 0.55–4.78)
VIT D+METAB SERPL-MCNC: 72.3 NG/ML (ref 30–100)
VLDLC SERPL CALC-MCNC: 19 MG/DL (ref 0–30)
WBC # BLD AUTO: 6.6 X10(3) UL (ref 4–11)

## 2024-10-17 PROCEDURE — 80061 LIPID PANEL: CPT | Performed by: STUDENT IN AN ORGANIZED HEALTH CARE EDUCATION/TRAINING PROGRAM

## 2024-10-17 PROCEDURE — 82306 VITAMIN D 25 HYDROXY: CPT | Performed by: STUDENT IN AN ORGANIZED HEALTH CARE EDUCATION/TRAINING PROGRAM

## 2024-10-17 PROCEDURE — 84439 ASSAY OF FREE THYROXINE: CPT | Performed by: STUDENT IN AN ORGANIZED HEALTH CARE EDUCATION/TRAINING PROGRAM

## 2024-10-17 PROCEDURE — 77080 DXA BONE DENSITY AXIAL: CPT | Performed by: STUDENT IN AN ORGANIZED HEALTH CARE EDUCATION/TRAINING PROGRAM

## 2024-10-17 PROCEDURE — 83036 HEMOGLOBIN GLYCOSYLATED A1C: CPT | Performed by: STUDENT IN AN ORGANIZED HEALTH CARE EDUCATION/TRAINING PROGRAM

## 2024-10-17 PROCEDURE — 80050 GENERAL HEALTH PANEL: CPT | Performed by: STUDENT IN AN ORGANIZED HEALTH CARE EDUCATION/TRAINING PROGRAM

## 2024-10-22 LAB
.: NORMAL
.: NORMAL

## 2024-10-22 NOTE — PROGRESS NOTES
Patient aware of Dr. Guan's recommendation:   \"Pt's thyroid level is elevated - I see she has an appointment with endocrinology, that is perfect, they will be able to advise her on next steps   Cholesterol is slightly high but not too bad, PCP should be able to provide more recommendations (doubt she would need any medication for it, just diet/exercise)\"    Patient verbalized understanding, agreed to and intend to comply with plan of care.

## 2024-11-01 ENCOUNTER — OFFICE VISIT (OUTPATIENT)
Facility: CLINIC | Age: 59
End: 2024-11-01
Payer: COMMERCIAL

## 2024-11-01 VITALS
DIASTOLIC BLOOD PRESSURE: 68 MMHG | BODY MASS INDEX: 20.56 KG/M2 | HEIGHT: 67 IN | WEIGHT: 131 LBS | OXYGEN SATURATION: 97 % | RESPIRATION RATE: 18 BRPM | HEART RATE: 78 BPM | SYSTOLIC BLOOD PRESSURE: 116 MMHG

## 2024-11-01 DIAGNOSIS — M81.0 OSTEOPOROSIS OF MULTIPLE SITES: Primary | ICD-10-CM

## 2024-11-01 DIAGNOSIS — E03.9 HYPOTHYROIDISM, UNSPECIFIED TYPE: ICD-10-CM

## 2024-11-01 PROCEDURE — 3074F SYST BP LT 130 MM HG: CPT | Performed by: STUDENT IN AN ORGANIZED HEALTH CARE EDUCATION/TRAINING PROGRAM

## 2024-11-01 PROCEDURE — 3008F BODY MASS INDEX DOCD: CPT | Performed by: STUDENT IN AN ORGANIZED HEALTH CARE EDUCATION/TRAINING PROGRAM

## 2024-11-01 PROCEDURE — 99214 OFFICE O/P EST MOD 30 MIN: CPT | Performed by: STUDENT IN AN ORGANIZED HEALTH CARE EDUCATION/TRAINING PROGRAM

## 2024-11-01 PROCEDURE — 3078F DIAST BP <80 MM HG: CPT | Performed by: STUDENT IN AN ORGANIZED HEALTH CARE EDUCATION/TRAINING PROGRAM

## 2024-11-01 RX ORDER — LEVOTHYROXINE SODIUM 50 UG/1
50 TABLET ORAL
Qty: 90 TABLET | Refills: 0 | Status: SHIPPED | OUTPATIENT
Start: 2024-11-01 | End: 2025-01-30

## 2024-11-01 NOTE — PATIENT INSTRUCTIONS
You do have elevated TSH which is suggestive of hypothyroidism, and I suggest we start back on a low dose of levothyroxine 50mcg. We'll plan to repeat your thyroid labs in a month to check how the dose is doing.  For your osteoporosis, we'll get some fasting labs (which you can do together with the thyroid labs in a month) to check if there's anything \"else\" contributing to bone loss. Assuming everything is as expected, we'll go ahead and plan to start on Prolia with the idea of using it for 2-3 years and then taking a break.     General follow up information:  Please let us know if you require any refills at least 1 week prior to your medication running out. If you do run out of medication, please call our office ASAP to request refills (do not wait until your follow up).  Please call us if you experience any problems with insurance coverage of medication, lab work, or imaging.   The on-call pager is for urgent matters only. If you are a type 1 diabetic and run out of insulin after business hours 8AM-4PM, you may call the on-call pager for a refill to a 24 hour pharmacy. If you have adrenal insufficiency and run out of steroids, you may call the on-call pager for a refill to a 24 hour pharmacy. All other refill requests should be requested during business hours.    Return Visit   [] Dr. Cherri BROWN pending Prolia start  [] Fasting/8AM labs  [] Dental clearance form

## 2024-11-01 NOTE — H&P
ENDOCRINOLOGY, DIABETES & METABOLISM CONSULT NOTE   Date: 11/1/24  Name: Jenny Nelson  Referring Physician: No ref. provider found    Subjective:    HISTORY OF PRESENT ILLNESS:   Jenny Nelson is a 59 year old female seen in consultation for her bone health evaluation.    OSTEOPOROSIS RISK FACTORS ASSESSMENT  Postmenopausal Yes, did not take HRT   Maternal hx of osteoporosis or hx of parental hip fx:  No   Recent Fracture: Yes, L SI joint fracture in 2024 (apparently atraumatic)   Previous fracture after the age of 50:  Yes, as above   Hx of kidney stones: No   Frequent falls: No   Poor vision: No   Decrease in height: Yes, 0.75\"   New or Worsening Back Pain: No   History of Vit D insufficiency:   Current Vitamin D supplementation: No   Poor intake of calcium:  Daily calcium intake: No   Caffeine intake: No   Smoking: No   Alcohol intake >3/d:  No   Hx of thyroid disease: Yes, hypothyroidism   Hx of calcium problems or hyperparathyroidism: No   Previous treatment for osteoporosis: No   Malabsorption, chronic diarrhea, celiac sprue: Pt is vegan   History of RA: No   History of disordered eating: No     Intake of medications that cause bone loss:  No    Treatment Contraindications:  Dysphagia: No  Pain on swallowing: No  Plans for dental procedures: No, using Invisalign so following frequently with dentist  History of radiation: No  History of bone tumors/bone cancer: No  History of Paget disease of the bone: No  Personal history of malignancy: No  Kidney function: GFR 86 in Oct 2024, CrCl 70.817  Alk phos: Normal  Atrial fibrillation: None  Hyperparathyroidism: Unknown     Allergies, PMH, SocHx and FHx reviewed and updated as appropriate in Epic on    levothyroxine 50 MCG Oral Tab Take 1 tablet (50 mcg total) by mouth before breakfast. 90 tablet 0     Allergies[1]  Current Outpatient Medications   Medication Sig Dispense Refill    levothyroxine 50 MCG Oral Tab Take 1 tablet (50 mcg total) by mouth before breakfast. 90  tablet 0     Past Medical History:    Amenorrhea    menopause    History of mammogram    Benign    Pap smear for cervical cancer screening    Negative HPV Negative    Thyroid disease     History reviewed. No pertinent surgical history.  Social History     Socioeconomic History    Marital status: Single   Tobacco Use    Smoking status: Never    Smokeless tobacco: Never   Vaping Use    Vaping status: Never Used   Substance and Sexual Activity    Alcohol use: No    Drug use: No    Sexual activity: Not Currently   Other Topics Concern    Caffeine Concern No    Exercise Yes    Seat Belt Yes    Special Diet Yes     Comment: vegan    Stress Concern No    Weight Concern No     Family History   Problem Relation Age of Onset    Heart Disorder Mother         valve replacement    Cancer Father     Other (Other) Father         neuromuscular disease    Breast Cancer Paternal Grandmother 55        55    Cancer Paternal Grandmother     Cancer Maternal Grandfather     Heart Disorder Maternal Grandfather         quadruple bypass    Ear Problems Neg     Allergies Neg     Bleeding Disorders Neg     Clotting Disorder Neg     Thyroid disease Neg     Hypertension Neg     Asthma Neg     Arthritis Neg     Diabetes Neg     Anesthesia Problems  Neg        REVIEW OF SYSTEMS: 10 point ROS completed, refer to HPI for pertinent positives    Objective:   PHYSICAL EXAMINATION:    Vitals:    11/01/24 1034   BP: 116/68   Pulse: 78   Resp: 18   SpO2: 97%   Weight: 131 lb (59.4 kg)   Height: 5' 7\" (1.702 m)     BMI: Body mass index is 20.52 kg/m².     General Appearance:  Alert, in no acute distress, thin habitus  Eyes:  normal conjunctivae, sclera  Ears/Nose/Mouth/Throat/Neck:  normal hearing, normal speech and no palpable thyroid nodules  Respiratory:  breathing comfortably on room air, clear to auscultation bilaterally  Cardiovascular:  regular rate and rhythm, no murmurs, no peripheral edema  Psychiatric:  Oriented to person, place and time,  appropriate mood & affect  Skin: Normal moisture and skin texture  Neuro: sensory grossly intact, motor grossly intact. normal gait.    Lab Review     Pertinent labs reviewed      Radiology Review     DEXA Year Location Therapy Lumbar BMD Lumbar T-Score Total Hip BMD Total Hip T-Score Femoral Neck BMD Femoral Neck T-Score Forearm BMD Forearm T-score   2019 [place] None 0.731 -2.9 0.773 -1.4 0.626 -2.0     2024 [place] None 0.682 -3.3 0.711 -1.9 0.575 -2.5                                                                               ASSESSMENT/PLAN:  Jenny Nelson is a 59 year old female seen in consultation for:    #Osteoporosis of multiple sites  #L SI joint fracture    Discussed pathophysiology of bone loss and clinical significance of DEXA scans with the patient.    The patient has confirmed osteoporosis with low T-scores in the lumbar spine and total hip/femoral neck.     Explained the patient's risk factors for osteoporosis, particularly .    The patient is at high risk for future osteoporotic fractures if her osteoporosis remains untreated.    Recommended workup for secondary causes of osteoporosis, including SPEP, PTH, celiac screen, Alk Phos levels, bone turnover markers, and vitamin D levels.    Discussed the importance of adopting lifestyle measures, such as adequate calcium and vitamin D intake, exercise, smoking cessation, counseling on fall prevention, and avoidance of heavy alcohol use, to reduce bone loss.    Suggested 1200 mg of elemental calcium daily (total diet plus supplement) and 1000 IU of vitamin D daily as general recommendations.    Recommended pharmacologic therapy for postmenopausal women with established osteoporosis or fragility fracture.    Discussed available treatment options for osteoporosis, including bisphosphonates (oral vs. IV), anabolics, Evenity, and Prolia injections, as well as their indications, risks, and benefits, including black box warnings.  Discussed concerns about an  increased risk of vertebral fracture after discontinuation of denosumab, the need for indefinite administration of denosumab   Given young age and significantly low T-scores at the level of the spine - if secondary workup unrevealing of any alternate causes of bone loss, recommend initiation of therapy with Prolia for 2-5 years followed by bisphosphonate bridging to drug holidays as appropriate  Reviewed risks/benefits/adverse effects of denosumab - patient agreeable to this plan    Dental clearance form provided    #Hypothyroidism  #Thyroid nodules  -Recent TSH >10, FT4 1.0, was on LT4 in the past and discontinued earlier this year  -Thyroid antibodies negative several years ago  -Etiology of hypothyroidism unclear, pt denies history of neck surgery or radiation, not on any medications that can induce hypothyroidism  -Most recent thyroid US 2018: homogenous echogenicity, R nodule of 2.2cm, L nodule 1.25cm   -Nodules were biopsied in 2017 and were benign  -Start LT4 50mcg daily, repeat TFTs and autoantibodies in 4-6 weeks    The above plan was discussed in detail with the patient who verbalized understanding and agreement.      Angelia Harley DO  [facility]  11/1/2024     Note to patient: The 21 Century Cures Act makes medical notes like these available to patients in the interest of transparency. However, be advised this is a medical document. It is intended as peer to peer communication. It is written in medical language and may contain abbreviations or verbiage that are unfamiliar. It may appear blunt or direct. Medical documents are intended to carry relevant information, facts as evident, and the clinical opinion of the practitioner.         [1] No Known Allergies

## 2024-11-18 ENCOUNTER — TELEPHONE (OUTPATIENT)
Facility: CLINIC | Age: 59
End: 2024-11-18

## 2024-11-18 NOTE — TELEPHONE ENCOUNTER
Last office note: For your osteoporosis, we'll get some fasting labs (which you can do together with the thyroid labs in a month) to check if there's anything \"else\" contributing to bone loss. Assuming everything is as expected, we'll go ahead and plan to start on Prolia with the idea of using it for 2-3 years and then taking a break.     Reviewed Dental clearance- patient cleared.     Clearance form placed in RN brown folder to await next steps.

## 2024-11-18 NOTE — TELEPHONE ENCOUNTER
Fax: Dental Clearance Letter    Dental Exam: 10/14/2024    Patient is cleared to receive reclast or prolia     Place in [address]

## 2024-12-14 ENCOUNTER — LABORATORY ENCOUNTER (OUTPATIENT)
Dept: LAB | Age: 59
End: 2024-12-14
Attending: STUDENT IN AN ORGANIZED HEALTH CARE EDUCATION/TRAINING PROGRAM
Payer: COMMERCIAL

## 2024-12-14 DIAGNOSIS — E03.9 HYPOTHYROIDISM, UNSPECIFIED TYPE: ICD-10-CM

## 2024-12-14 DIAGNOSIS — M81.0 OSTEOPOROSIS OF MULTIPLE SITES: ICD-10-CM

## 2024-12-14 LAB
IGA SERPL-MCNC: 253.4 MG/DL (ref 70–312)
MAGNESIUM SERPL-MCNC: 2 MG/DL (ref 1.6–2.6)
PHOSPHATE SERPL-MCNC: 3.7 MG/DL (ref 2.4–5.1)
PTH-INTACT SERPL-MCNC: 34.9 PG/ML (ref 18.5–88)
T4 FREE SERPL-MCNC: 1.3 NG/DL (ref 0.8–1.7)
THYROGLOB SERPL-MCNC: <15 U/ML (ref ?–60)
THYROPEROXIDASE AB SERPL-ACNC: 28 U/ML (ref ?–60)
TSI SER-ACNC: 4.81 UIU/ML (ref 0.55–4.78)

## 2024-12-14 PROCEDURE — 82784 ASSAY IGA/IGD/IGG/IGM EACH: CPT | Performed by: STUDENT IN AN ORGANIZED HEALTH CARE EDUCATION/TRAINING PROGRAM

## 2024-12-14 PROCEDURE — 86376 MICROSOMAL ANTIBODY EACH: CPT | Performed by: STUDENT IN AN ORGANIZED HEALTH CARE EDUCATION/TRAINING PROGRAM

## 2024-12-14 PROCEDURE — 84165 PROTEIN E-PHORESIS SERUM: CPT | Performed by: STUDENT IN AN ORGANIZED HEALTH CARE EDUCATION/TRAINING PROGRAM

## 2024-12-14 PROCEDURE — 84443 ASSAY THYROID STIM HORMONE: CPT | Performed by: STUDENT IN AN ORGANIZED HEALTH CARE EDUCATION/TRAINING PROGRAM

## 2024-12-14 PROCEDURE — 84100 ASSAY OF PHOSPHORUS: CPT | Performed by: STUDENT IN AN ORGANIZED HEALTH CARE EDUCATION/TRAINING PROGRAM

## 2024-12-14 PROCEDURE — 86334 IMMUNOFIX E-PHORESIS SERUM: CPT | Performed by: STUDENT IN AN ORGANIZED HEALTH CARE EDUCATION/TRAINING PROGRAM

## 2024-12-14 PROCEDURE — 82523 COLLAGEN CROSSLINKS: CPT | Performed by: STUDENT IN AN ORGANIZED HEALTH CARE EDUCATION/TRAINING PROGRAM

## 2024-12-14 PROCEDURE — 83970 ASSAY OF PARATHORMONE: CPT | Performed by: STUDENT IN AN ORGANIZED HEALTH CARE EDUCATION/TRAINING PROGRAM

## 2024-12-14 PROCEDURE — 83521 IG LIGHT CHAINS FREE EACH: CPT | Performed by: STUDENT IN AN ORGANIZED HEALTH CARE EDUCATION/TRAINING PROGRAM

## 2024-12-14 PROCEDURE — 86800 THYROGLOBULIN ANTIBODY: CPT | Performed by: STUDENT IN AN ORGANIZED HEALTH CARE EDUCATION/TRAINING PROGRAM

## 2024-12-14 PROCEDURE — 84439 ASSAY OF FREE THYROXINE: CPT | Performed by: STUDENT IN AN ORGANIZED HEALTH CARE EDUCATION/TRAINING PROGRAM

## 2024-12-14 PROCEDURE — 84080 ASSAY ALKALINE PHOSPHATASES: CPT | Performed by: STUDENT IN AN ORGANIZED HEALTH CARE EDUCATION/TRAINING PROGRAM

## 2024-12-14 PROCEDURE — 86364 TISS TRNSGLTMNASE EA IG CLAS: CPT | Performed by: STUDENT IN AN ORGANIZED HEALTH CARE EDUCATION/TRAINING PROGRAM

## 2024-12-14 PROCEDURE — 83735 ASSAY OF MAGNESIUM: CPT | Performed by: STUDENT IN AN ORGANIZED HEALTH CARE EDUCATION/TRAINING PROGRAM

## 2024-12-16 LAB — TTG IGA SER-ACNC: 0.6 U/ML (ref ?–7)

## 2024-12-17 LAB
ALBUMIN SERPL ELPH-MCNC: 4.83 G/DL (ref 3.75–5.21)
ALBUMIN/GLOB SERPL: 1.75 {RATIO} (ref 1–2)
ALPHA1 GLOB SERPL ELPH-MCNC: 0.23 G/DL (ref 0.19–0.46)
ALPHA2 GLOB SERPL ELPH-MCNC: 0.68 G/DL (ref 0.48–1.05)
B-GLOBULIN SERPL ELPH-MCNC: 0.84 G/DL (ref 0.68–1.23)
GAMMA GLOB SERPL ELPH-MCNC: 1.02 G/DL (ref 0.62–1.7)
KAPPA LC FREE SER-MCNC: 1.42 MG/DL (ref 0.33–1.94)
KAPPA LC FREE/LAMBDA FREE SER NEPH: 1.49 {RATIO} (ref 0.26–1.65)
LAMBDA LC FREE SERPL-MCNC: 0.95 MG/DL (ref 0.57–2.63)
PROT SERPL-MCNC: 7.6 G/DL (ref 5.7–8.2)

## 2024-12-18 LAB — ALKALINE PHOSPHATASE BONE SPECIFIC: 20.4 UG/L

## 2024-12-18 NOTE — PROGRESS NOTES
Please let patient know that her osteoporosis labs so far look normal and there are no obvious underlying causes of osteoporosis. Please go ahead and initiate Prolia approval for her and schedule when ready, I can see her on the day of Prolia #2.     TSH remains elevated, I would like her to increase her LT4 to 75mcg daily (if she still has a lot of 50mcg tabs left, can start by increasing to two tabs on Sundays). Repeat TFTs in 6 weeks.

## 2024-12-19 LAB — C-TELOPEPTIDE: 556 PG/ML

## 2024-12-23 ENCOUNTER — MED REC SCAN ONLY (OUTPATIENT)
Facility: CLINIC | Age: 59
End: 2024-12-23

## 2025-04-01 ENCOUNTER — PATIENT MESSAGE (OUTPATIENT)
Facility: CLINIC | Age: 60
End: 2025-04-01

## 2025-04-03 ENCOUNTER — LAB ENCOUNTER (OUTPATIENT)
Dept: LAB | Age: 60
End: 2025-04-03
Attending: INTERNAL MEDICINE
Payer: COMMERCIAL

## 2025-04-03 DIAGNOSIS — E03.9 HYPOTHYROIDISM, UNSPECIFIED TYPE: ICD-10-CM

## 2025-04-03 LAB
T4 FREE SERPL-MCNC: 1.5 NG/DL (ref 0.8–1.7)
TSI SER-ACNC: 1.77 UIU/ML (ref 0.55–4.78)

## 2025-04-03 PROCEDURE — 84439 ASSAY OF FREE THYROXINE: CPT

## 2025-04-03 PROCEDURE — 36415 COLL VENOUS BLD VENIPUNCTURE: CPT

## 2025-04-03 PROCEDURE — 84443 ASSAY THYROID STIM HORMONE: CPT

## 2025-04-03 RX ORDER — LEVOTHYROXINE SODIUM 75 UG/1
75 TABLET ORAL
Qty: 90 TABLET | Refills: 1 | Status: SHIPPED | OUTPATIENT
Start: 2025-04-03

## 2025-04-03 NOTE — PROGRESS NOTES
Labs look good on current levothyroxine, will send over a refill. As things have normalized it is her choice if she wants to continue following with our office for her thyroid or if she wants to follow with PCP. If she wants to follow with us, will suggest next labs in 6 months with an appointment with one of the other docs as I'll be on maternity leave then. Thanks!

## 2025-04-06 NOTE — TELEPHONE ENCOUNTER
Result note: Labs look good on current levothyroxine, will send over a refill. As things have normalized it is her choice if she wants to continue following with our office for her thyroid or if she wants to follow with PCP. If she wants to follow with us, will suggest next labs in 6 months with an appointment with one of the other docs as I'll be on maternity leave then.     Lena sent for review.

## 2025-08-12 ENCOUNTER — TELEPHONE (OUTPATIENT)
Dept: INTERNAL MEDICINE CLINIC | Facility: CLINIC | Age: 60
End: 2025-08-12

## (undated) NOTE — MR AVS SNAPSHOT
[address] [place] [id number] [phone]               Thank you for choosing us for your health care visit with Regan Perdue MD.  We are glad to serve you and happy to provide you with this summary MethylPREDNISolone 4 MG Kit   Take as directed on package           * Notice: This list has 2 medication(s) that are the same as other medications prescribed for you.  Read the directions carefully, and ask your doctor or other care provider to review the

## (undated) NOTE — MR AVS SNAPSHOT
After Visit Summary   6/1/7277    Mariusz Alvarenga    MRN: [id number]           Visit Information     Date & Time  8/9/2021 12:30 PM Provider  Mena Mg MD Department  [facility] [address], [address], [facility] [place] Dept.  Phone  21  The cost for a Video Visit is currently $35.         If you receive a survey from [employer], please take a few minutes to complete it and provide feedback. We strive to deliver the best patient experience and are looking for ways to make improvements.  Valentino Josue Average cost  $120*     EMERGENCY ROOM Life-threatening emergencies needing immediate intervention at a hospital emergency room.  Average cost  $2,300*   *Cost varies based on your insurance coverage  For more information about hours, locations or appointme

## (undated) NOTE — MR AVS SNAPSHOT
After Visit Summary   2/82/7693    Stephen Yi    MRN: [id number]           Visit Information     Date & Time  7/29/2020  5:30 PM Provider  Jael Moreno MD Department  [facility] [address], [address]az, [facility] [place] Dept.  Phone  [phone]      Your Suzanna Instructions: To schedule an appointment for your radiology test please call [facility] Scheduling at [phone]. [facility] now offers Video Visits through [address] for adult and pediatric patients.   Video Visits are available [facility] Nael Renae   Monday – Friday  10:00 am – 10:00 pm   Saturday – Sunday  10:00 am – 4:00 pm     [address]   Monday – Friday  4:00 pm – 10:00 pm   Saturday – Sunday  10:00 am – 4:00 pm  [facility]IN [facility]  E